# Patient Record
Sex: MALE | Race: WHITE | NOT HISPANIC OR LATINO | Employment: FULL TIME | URBAN - METROPOLITAN AREA
[De-identification: names, ages, dates, MRNs, and addresses within clinical notes are randomized per-mention and may not be internally consistent; named-entity substitution may affect disease eponyms.]

---

## 2017-07-21 ENCOUNTER — OFFICE VISIT (OUTPATIENT)
Dept: OCCUPATIONAL MEDICINE | Facility: CLINIC | Age: 57
End: 2017-07-21
Payer: COMMERCIAL

## 2017-07-21 VITALS
OXYGEN SATURATION: 99 % | BODY MASS INDEX: 25.9 KG/M2 | TEMPERATURE: 99 F | WEIGHT: 165 LBS | DIASTOLIC BLOOD PRESSURE: 80 MMHG | SYSTOLIC BLOOD PRESSURE: 130 MMHG | HEART RATE: 69 BPM | HEIGHT: 67 IN

## 2017-07-21 DIAGNOSIS — Z77.21 CONTACT WITH AND (SUSPECTED) EXPOSURE TO POTENTIALLY HAZARDOUS BODY FLUIDS: ICD-10-CM

## 2017-07-21 PROCEDURE — 99212 OFFICE O/P EST SF 10 MIN: CPT | Mod: S$GLB,,, | Performed by: NURSE PRACTITIONER

## 2017-07-21 PROCEDURE — 36415 COLL VENOUS BLD VENIPUNCTURE: CPT | Mod: S$GLB,,, | Performed by: NURSE PRACTITIONER

## 2017-07-21 PROCEDURE — 99000 SPECIMEN HANDLING OFFICE-LAB: CPT | Mod: S$GLB,,, | Performed by: NURSE PRACTITIONER

## 2017-07-21 RX ORDER — ZOLPIDEM TARTRATE 6.25 MG/1
6.25 TABLET, FILM COATED, EXTENDED RELEASE ORAL NIGHTLY PRN
COMMUNITY

## 2017-07-21 RX ORDER — TRAMADOL HYDROCHLORIDE 50 MG/1
50 TABLET ORAL EVERY 6 HOURS PRN
COMMUNITY

## 2017-07-21 NOTE — LETTER
[facility]  [address] [place] [address]  Phone: [phone]  Fax: [phone]    Pt Name: Catrachito Malloy  Injury Date: 06/06/2017   Employee ID:  Date of First Treatment: 06/08/2017   Company: [employer]            Appointment Time: 12:45 PM Arrived:  1:00 PM CDT   Physician: [facility] Departed: 2:21 PM           Office Treatment: Catrachito was seen today for body fluid exposure.    Diagnoses and all orders for this visit:    Contact with and (suspected) exposure to potentially hazardous body fluids            Restrictions: Regular Duty       Return Appointment:   9/1/2017  at  12:00 PM

## 2017-07-21 NOTE — ADDENDUM NOTE
Encounter addended by: George Crews MA on: 7/21/2017  2:23 PM<BR>    Actions taken: Letter status changed

## 2017-09-01 ENCOUNTER — OFFICE VISIT (OUTPATIENT)
Dept: OCCUPATIONAL MEDICINE | Facility: CLINIC | Age: 57
End: 2017-09-01
Payer: COMMERCIAL

## 2017-09-01 VITALS
OXYGEN SATURATION: 98 % | TEMPERATURE: 99 F | SYSTOLIC BLOOD PRESSURE: 102 MMHG | HEART RATE: 83 BPM | DIASTOLIC BLOOD PRESSURE: 64 MMHG

## 2017-09-01 DIAGNOSIS — Z77.21: ICD-10-CM

## 2017-09-01 PROCEDURE — 36415 COLL VENOUS BLD VENIPUNCTURE: CPT | Mod: S$GLB,,, | Performed by: NURSE PRACTITIONER

## 2017-09-01 PROCEDURE — 99000 SPECIMEN HANDLING OFFICE-LAB: CPT | Mod: S$GLB,,, | Performed by: NURSE PRACTITIONER

## 2017-09-01 PROCEDURE — 3008F BODY MASS INDEX DOCD: CPT | Mod: S$GLB,,, | Performed by: NURSE PRACTITIONER

## 2017-09-01 PROCEDURE — 99212 OFFICE O/P EST SF 10 MIN: CPT | Mod: S$GLB,,, | Performed by: NURSE PRACTITIONER

## 2017-09-01 NOTE — PROGRESS NOTES
Subjective:       Patient ID: Catrachito aMlloy is a 56 y.o. male.    Chief Complaint: Arm Injury ([employer] employee presents for 3rd BBP labs)    [employer]  presents for 3rd bbp labs draw. Patient denies experiencing any pain or discomfort in LEFT forearm stemming from original incident. (yal)      Arm Injury    The incident occurred more than 1 week ago. The incident occurred at work. The pain is present in the left forearm. The pain does not radiate. The pain is at a severity of 0/10. The patient is experiencing no pain. Pertinent negatives include no chest pain, numbness or tingling. Nothing aggravates the symptoms. He has tried nothing for the symptoms.     Review of Systems   Constitution: Negative for chills and fever.   Eyes: Negative for discharge.   Cardiovascular: Negative for chest pain.   Endocrine: Negative for polyuria.   Skin: Negative for rash.   Musculoskeletal: Positive for joint pain and myalgias.   Gastrointestinal: Negative for nausea and vomiting.   Genitourinary: Negative for dysuria.   Neurological: Negative for numbness and tingling.   Allergic/Immunologic: Negative for hives.       Objective:      Physical Exam   Constitutional: He appears well-developed and well-nourished.   HENT:   Nose: Nose normal.   Eyes: Conjunctivae are normal.   Pulmonary/Chest: Effort normal.   Skin: Skin is warm and dry.   Psychiatric: He has a normal mood and affect. His behavior is normal.       Assessment:       1. Personal history of contact with and (suspected) exposure to potentially hazardous body fluids        Plan:                Restrictions: Regular Duty    RTC 12/1/17

## 2017-09-01 NOTE — LETTER
[facility]  [place] [address] [place] [address]  Phone: [phone]  Fax: [phone]    Pt Name: Catrachito Malloy  Injury Date: 06/06/2017   Employee ID:  Date of Treatment: 09/01/2017   Company: [employer]            Appointment Time: 11:45 AM Arrived: 11:25 AM CDT   Physician: Ernestine Newton NP Departed: 12:25 PM           Office Treatment: Catrachito was seen today for arm injury.    Diagnoses and all orders for this visit:    Personal history of contact with and (suspected) exposure to potentially hazardous body fluids         REGULAR DUTY           Return Appointment:   12/1/2017  For FINAL BBP labs

## 2017-12-01 ENCOUNTER — OFFICE VISIT (OUTPATIENT)
Dept: OCCUPATIONAL MEDICINE | Facility: CLINIC | Age: 57
End: 2017-12-01
Payer: COMMERCIAL

## 2017-12-01 DIAGNOSIS — Z77.21 CONTACT WITH AND (SUSPECTED) EXPOSURE TO POTENTIALLY HAZARDOUS BODY FLUIDS (CODE): Primary | ICD-10-CM

## 2017-12-01 PROCEDURE — 99000 SPECIMEN HANDLING OFFICE-LAB: CPT | Mod: S$GLB,,, | Performed by: PREVENTIVE MEDICINE

## 2017-12-01 PROCEDURE — 36415 COLL VENOUS BLD VENIPUNCTURE: CPT | Mod: S$GLB,,, | Performed by: PREVENTIVE MEDICINE

## 2017-12-01 PROCEDURE — 86703 HIV-1/HIV-2 1 RESULT ANTBDY: CPT | Mod: ,,, | Performed by: PREVENTIVE MEDICINE

## 2017-12-01 PROCEDURE — 99213 OFFICE O/P EST LOW 20 MIN: CPT | Mod: S$GLB,,, | Performed by: PHYSICIAN ASSISTANT

## 2017-12-01 NOTE — LETTER
[facility] [address] [place] [address]  Phone: [phone]  Fax: [phone]    Pt Name: Catrachito Malloy  Injury Date: 06/06/2017   Employee ID:  Date of Treatment: 12/01/2017   Company: [employer]            Appointment Time: 10:45 AM Arrived: 11:00 AM CST   Physician: Maicol Garay PA-C Departed: 1125 AM           Office Treatment: EXAM, FINAL BBP LABS DRAWN,     DISCHARGED TO FULL DUTY                Return Appointment: NONE

## 2018-11-02 ENCOUNTER — OFFICE VISIT (OUTPATIENT)
Dept: URGENT CARE | Facility: CLINIC | Age: 58
End: 2018-11-02
Payer: COMMERCIAL

## 2018-11-02 VITALS
HEIGHT: 67 IN | HEART RATE: 80 BPM | BODY MASS INDEX: 25.9 KG/M2 | DIASTOLIC BLOOD PRESSURE: 94 MMHG | SYSTOLIC BLOOD PRESSURE: 168 MMHG | WEIGHT: 165 LBS | TEMPERATURE: 98 F

## 2018-11-02 DIAGNOSIS — S60.222A CONTUSION OF LEFT HAND, INITIAL ENCOUNTER: Primary | ICD-10-CM

## 2018-11-02 DIAGNOSIS — S69.92XA HAND INJURY, LEFT, INITIAL ENCOUNTER: ICD-10-CM
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
## 2018-11-02 DIAGNOSIS — M25.532 LEFT WRIST PAIN: ICD-10-CM

## 2018-11-02 LAB — POC BREATH ALCOHOL: NORMAL

## 2018-11-02 PROCEDURE — 82075 ASSAY OF BREATH ETHANOL: CPT | Mod: S$GLB,,, | Performed by: PHYSICIAN ASSISTANT

## 2018-11-02 PROCEDURE — 73110 X-RAY EXAM OF WRIST: CPT | Mod: FY,LT,S$GLB, | Performed by: RADIOLOGY

## 2018-11-02 PROCEDURE — 80305 DRUG TEST PRSMV DIR OPT OBS: CPT | Mod: S$GLB,,, | Performed by: PHYSICIAN ASSISTANT

## 2018-11-02 PROCEDURE — 99214 OFFICE O/P EST MOD 30 MIN: CPT | Mod: 25,S$GLB,, | Performed by: PHYSICIAN ASSISTANT

## 2018-11-02 PROCEDURE — 73130 X-RAY EXAM OF HAND: CPT | Mod: FY,LT,S$GLB, | Performed by: RADIOLOGY

## 2018-11-02 RX ORDER — NAPROXEN 500 MG/1
500 TABLET ORAL 2 TIMES DAILY WITH MEALS
Qty: 30 TABLET | Refills: 0 | Status: SHIPPED | OUTPATIENT
Start: 2018-11-02 | End: 2018-11-09 | Stop reason: ALTCHOICE

## 2018-11-02 NOTE — PROGRESS NOTES
Subjective:       Patient ID: Catrachito Malloy is a 58 y.o. male.    Chief Complaint: Hand Pain (Left Hand (10/31/18))    Patient is a  for [employer]. Patient states on 10/31/18 @ 0930 he smashed his hand between hand cuffs while trying to hold on to a thief that was trying to get loose from his  causing pain and swelling to the top of left wrist, hand and index finger. Patient states the  Left Index Finger hurts mostly. Pain 6/10. Taking Tramadol for pain (from pain management doctor due to back surgery fusion 2001 and back laminectomy in 2003. Tramadol minimizes pain.  Patient is wearing finger splint from old injury to finger tips on right hand 8 months ago.  Working regular duty. Ambulatory. MJB  Pt states he is ambidextrous.       Hand Pain    The incident occurred 3 to 5 days ago. The incident occurred at work. The pain is present in the left fingers, left wrist and left hand (Left hand has burning sensation and left index finger has an aching pain). The quality of the pain is described as aching and burning. The pain does not radiate. The pain is at a severity of 6/10. The pain is moderate. The pain has been constant since the incident. Pertinent negatives include no chest pain or numbness. The symptoms are aggravated by movement and lifting. He has tried immobilization (Tramadol) for the symptoms. The treatment provided mild relief.     Review of Systems   Constitution: Negative for chills and fever.   HENT: Negative for hearing loss, nosebleeds and sore throat.    Eyes: Negative for blurred vision and redness.   Cardiovascular: Negative for chest pain and syncope.   Respiratory: Negative for cough and shortness of breath.    Hematologic/Lymphatic: Negative for bleeding problem.   Skin: Negative for color change and rash.   Musculoskeletal: Positive for joint pain, joint swelling and stiffness. Negative for back pain and neck pain.   Gastrointestinal: Negative for abdominal  pain, diarrhea, nausea and vomiting.   Neurological: Negative for headaches, numbness and paresthesias.   Psychiatric/Behavioral: The patient is not nervous/anxious.        Objective:      Physical Exam   Constitutional: He appears well-developed and well-nourished. He is active. No distress.   HENT:   Head: Normocephalic and atraumatic.   Right Ear: Hearing and external ear normal.   Left Ear: Hearing and external ear normal.   Nose: Nose normal. No nasal deformity. No epistaxis.   Mouth/Throat: Oropharynx is clear and moist and mucous membranes are normal.   Eyes: Conjunctivae and lids are normal. No scleral icterus.   Neck: Trachea normal and normal range of motion.   Cardiovascular: Intact distal pulses and normal pulses.   Pulmonary/Chest: Effort normal. No stridor. No respiratory distress.   Musculoskeletal:        Left wrist: He exhibits tenderness. He exhibits normal range of motion and no swelling.        Left hand: He exhibits decreased range of motion (index finger), tenderness and swelling. He exhibits normal capillary refill and no deformity. Normal sensation noted. Decreased strength noted.        Hands:  Neurological: He is alert. He has normal strength. He is not disoriented. No sensory deficit. GCS eye subscore is 4. GCS verbal subscore is 5. GCS motor subscore is 6.   Skin: Skin is warm, dry and intact. Capillary refill takes less than 2 seconds. He is not diaphoretic.   Psychiatric: He has a normal mood and affect. His speech is normal and behavior is normal. He is attentive.   Nursing note and vitals reviewed.        X-ray Wrist Complete Left    Result Date: 11/2/2018  EXAMINATION: XR WRIST COMPLETE 3 VIEWS LEFT CLINICAL HISTORY: Unspecified injury of left wrist, hand and finger(s), initial encounter TECHNIQUE: PA, lateral, and oblique views of the left wrist were performed. COMPARISON: None FINDINGS: Three views. No dislocation.  On the oblique lateral view, there is questionable lucency  involving the trapezium, correlation with any focal tenderness in the region is recommended.  This is not demonstrated on the lateral view of the hand performed just prior to this exam..  There is mild widening of the scapholunate interval, may reflect sequela of degenerative change.  There is edema about the dorsal aspect of the hand and wrist.     1. Possible lucency involving the trapezium on the oblique lateral view versus artifact, correlation with any focal tenderness in the region is recommended. 2. Mild widening of the scapholunate interval, could reflect sequela of degenerative change. Electronically signed by: Darci Toussaint MD Date:    11/02/2018 Time:    14:55    X-ray Hand 3 View Left    Result Date: 11/2/2018  EXAMINATION: XR HAND COMPLETE 3 VIEW LEFT CLINICAL HISTORY: . Unspecified injury of left wrist, hand and finger(s), initial encounter TECHNIQUE: PA, lateral, and oblique views of the left hand were performed. COMPARISON: None FINDINGS: Three views. No acute displaced fracture or dislocation of the hand.  There may be edema about the dorsal aspect.  No radiopaque foreign body.     1. No acute displaced fracture or dislocation of the hand noting possible edema about the dorsal aspect. Electronically signed by: Darci Toussaint MD Date:    11/02/2018 Time:    14:50    Assessment:       1. Contusion of left hand, initial encounter    2. Left wrist pain    3. Hand injury, left, initial encounter    4. Work related injury        Plan:         Medications Ordered This Encounter   Medications    naproxen (NAPROSYN) 500 MG tablet     Sig: Take 1 tablet (500 mg total) by mouth 2 (two) times daily with meals.     Dispense:  30 tablet     Refill:  0         Restrictions: (Office duty only. No field work. )  Follow-up in about 1 week (around 11/9/2018).        Patient Instructions     Hand Contusion  You have a contusion. This is also called a bruise. There is swelling and some bleeding under the skin, but  no broken bones. This injury generally takes a few days to a few weeks to heal.  During that time, the bruise will typically change in color from reddish, to purple-blue, to greenish-yellow, then to yellow-brown.  Home care  · Elevate the hand to reduce pain and swelling. As much as possible, sit or lie down with the hand raised about the level of your heart. This is especially important during the first 48 hours.  · Ice the hand to help reduce pain and swelling. Wrap a cold source (ice pack or ice cubes in a plastic bag) in a thin towel. Apply to the bruised area for 20 minutes every 1 to 2 hours the first day. Continue this 3 to 4 times a day until the pain and swelling goes away.  · Unless another medicine was prescribed, you can take acetaminophen, ibuprofen, or naproxen to control pain. (If you have chronic liver or kidney disease or ever had a stomach ulcer or gastrointestinal bleeding, talk with your doctor before using these medicines.)  Follow up  Follow up with your healthcare provider or our staff as advised. Call if you are not improving within 1 to 2 weeks.  When to seek medical advice   Call your healthcare provider right away if you have any of the following:  · Increased pain or swelling  · Arm becomes cold, blue, numb or tingly  · Signs of infection: Warmth, drainage, or increased redness or pain around the bruise  · Inability to move the injured hand   · Frequent bruising for unknown reasons  Date Last Reviewed: 2/1/2017  © 3282-3389 [employer]. [address] [place] [address], [place] [address]. All rights reserved. This information is not intended as a substitute for professional medical care. Always follow your healthcare professional's instructions.

## 2018-11-02 NOTE — PATIENT INSTRUCTIONS
Hand Contusion  You have a contusion. This is also called a bruise. There is swelling and some bleeding under the skin, but no broken bones. This injury generally takes a few days to a few weeks to heal.  During that time, the bruise will typically change in color from reddish, to purple-blue, to greenish-yellow, then to yellow-brown.  Home care  · Elevate the hand to reduce pain and swelling. As much as possible, sit or lie down with the hand raised about the level of your heart. This is especially important during the first 48 hours.  · Ice the hand to help reduce pain and swelling. Wrap a cold source (ice pack or ice cubes in a plastic bag) in a thin towel. Apply to the bruised area for 20 minutes every 1 to 2 hours the first day. Continue this 3 to 4 times a day until the pain and swelling goes away.  · Unless another medicine was prescribed, you can take acetaminophen, ibuprofen, or naproxen to control pain. (If you have chronic liver or kidney disease or ever had a stomach ulcer or gastrointestinal bleeding, talk with your doctor before using these medicines.)  Follow up  Follow up with your healthcare provider or our staff as advised. Call if you are not improving within 1 to 2 weeks.  When to seek medical advice   Call your healthcare provider right away if you have any of the following:  · Increased pain or swelling  · Arm becomes cold, blue, numb or tingly  · Signs of infection: Warmth, drainage, or increased redness or pain around the bruise  · Inability to move the injured hand   · Frequent bruising for unknown reasons  Date Last Reviewed: 2/1/2017 © 2000-2017 [employer]. [address] [place] [address], [place] [address]. All rights reserved. This information is not intended as a substitute for professional medical care. Always follow your healthcare professional's instructions.

## 2018-11-02 NOTE — LETTER
[facility]  [address] [place] [address]  Phone: [phone]  Fax: [phone]  [facility]: [id number]    Pt Name: Catrachito Malloy  Injury Date: 10/31/2018   Employee ID:  Date of First Treatment: 11/02/2018   Company: [employer]      Appointment Time: 01:35 PM Arrived: 1320 PM   Provider: Maicol Garay PA-C Time Out: 1510 PM     Office Treatment:   EXAM  X-RAYS  RX GIVEN  DRUG AND ALCOHOL SCREEN  OFFICE DUTY ONLY. NO FIELD WORK  LIGHT DUTY   1. Contusion of left hand, initial encounter    2. Left wrist pain    3. Hand injury, left, initial encounter    4. Work related injury      Medications Ordered This Encounter   Medications    naproxen (NAPROSYN) 500 MG tablet           Restrictions: (Office duty only. No field work. )     Return Appointment: 11/09/2018 @ 9:30 AM

## 2018-11-02 NOTE — LETTER
[facility]  [address] [place] [address]  Phone: [phone]  Fax: [phone]  [facility]: [id number]    Pt Name: Catrachito Malloy  Injury Date: 10/31/2018   Employee ID:  Date of First Treatment: 11/02/2018   Company: [employer]      Appointment Time: 01:35 PM Arrived: 1320 PM   Provider: Maicol Garay PA-C Time Out: 1510 PM     Office Treatment:   EXAM  X-RAYS  RX GIVEN  OFFICE DUTY ONLY. NO FIELD WORK  LIGHT DUTY     1. Contusion of left hand, initial encounter    2. Left wrist pain    3. Hand injury, left, initial encounter    4. Work related injury      Medications Ordered This Encounter   Medications    naproxen (NAPROSYN) 500 MG tablet           Restrictions: (Office duty only. No field work. )     Return Appointment: 11/09/2018 @ 9:30 AM

## 2018-11-09 ENCOUNTER — OFFICE VISIT (OUTPATIENT)
Dept: URGENT CARE | Facility: CLINIC | Age: 58
End: 2018-11-09
Payer: OTHER MISCELLANEOUS
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
## 2018-11-09 DIAGNOSIS — S69.92XS HAND INJURY, LEFT, SEQUELA: ICD-10-CM

## 2018-11-09 DIAGNOSIS — S69.92XD LEFT WRIST INJURY, SUBSEQUENT ENCOUNTER: ICD-10-CM

## 2018-11-09 DIAGNOSIS — M25.532 LEFT WRIST PAIN: Primary | ICD-10-CM

## 2018-11-09 PROCEDURE — 99214 OFFICE O/P EST MOD 30 MIN: CPT | Mod: S$GLB,,, | Performed by: PHYSICIAN ASSISTANT

## 2018-11-09 PROCEDURE — 73110 X-RAY EXAM OF WRIST: CPT | Mod: FY,LT,S$GLB, | Performed by: RADIOLOGY

## 2018-11-09 RX ORDER — ACETAMINOPHEN 500 MG
1000 TABLET ORAL EVERY 6 HOURS PRN
Qty: 50 TABLET | Refills: 0 | Status: SHIPPED | OUTPATIENT
Start: 2018-11-09 | End: 2019-11-09

## 2018-11-09 NOTE — LETTER
[facility]  [address] [place] [address]  Phone: [phone]  Fax: [phone]  [facility]: [id number]    Pt Name: Catrachito Malloy  Injury Date: 10/31/2018   Employee ID:  Date of Treatment: 11/09/2018   Company: [employer]      Appointment Time: 09:15 AM Arrived: 0930 AM   Provider: Maicol Garay PA-C Time Out:1100 AM     Office Treatment:   EXAM  RX GIVEN  SPLINT APPLIED  MRI ONCE APPROVED  LIGHT DUTY    1. Left wrist pain    2. Left wrist injury, subsequent encounter    3. Hand injury, left, sequela    4. Work related injury      Medications Ordered This Encounter   Medications    acetaminophen (TYLENOL EXTRA STRENGTH) 500 MG tablet      Patient Instructions: Use splint as directed, Attention not to aggravate affected area, MRI to be scheduled once authorized      Restrictions: (Office duty only. No field work.)     Return Appointment: 11/16/2018 at 0930 AM

## 2018-11-09 NOTE — PROGRESS NOTES
Subjective:       Patient ID: Catrachito Malloy is a 58 y.o. male.    Chief Complaint: Hand Injury (left hand 10/31/18)    Patient is a follow up for left hand from 10/31/18. Patient states pain is worse 7/10, working light dluty, Naproxen not helping and the pain is in wrist and hand now. Ambulatory. MJB      Hand Injury    His dominant hand is their right hand. The incident occurred more than 1 week ago. The incident occurred at work. The pain is present in the left wrist and left hand. The quality of the pain is described as aching. The pain is at a severity of 7/10. The pain is mild. The pain has been worsening since the incident. Pertinent negatives include no chest pain or numbness. He has tried NSAIDs for the symptoms. The treatment provided no relief.     Review of Systems   Constitution: Negative for chills and fever.   HENT: Negative for hearing loss, nosebleeds and sore throat.    Eyes: Negative for blurred vision and redness.   Cardiovascular: Negative for chest pain and syncope.   Respiratory: Negative for cough and shortness of breath.    Hematologic/Lymphatic: Negative for bleeding problem.   Skin: Negative for color change and rash.   Musculoskeletal: Positive for joint pain, joint swelling and stiffness. Negative for back pain and neck pain.   Gastrointestinal: Negative for abdominal pain, diarrhea, nausea and vomiting.   Neurological: Negative for headaches, numbness and paresthesias.   Psychiatric/Behavioral: The patient is not nervous/anxious.        Objective:      Physical Exam   Constitutional: He appears well-developed and well-nourished. He is active. No distress.   HENT:   Head: Normocephalic and atraumatic.   Right Ear: Hearing and external ear normal.   Left Ear: Hearing and external ear normal.   Nose: Nose normal. No nasal deformity. No epistaxis.   Mouth/Throat: Oropharynx is clear and moist and mucous membranes are normal.   Eyes: Conjunctivae and lids are normal. No scleral icterus.    Neck: Trachea normal and normal range of motion.   Cardiovascular: Intact distal pulses and normal pulses.   Pulmonary/Chest: Effort normal. No stridor. No respiratory distress.   Musculoskeletal:        Left wrist: He exhibits decreased range of motion, tenderness (dorsal-radial aspect) and swelling.        Left hand: He exhibits decreased range of motion (index finger flexion at MCP), tenderness (2nd MCPJ) and swelling. He exhibits normal capillary refill and no deformity. Normal sensation noted.   Neurological: He is alert. He has normal strength. He is not disoriented. No sensory deficit. GCS eye subscore is 4. GCS verbal subscore is 5. GCS motor subscore is 6.   Skin: Skin is warm, dry and intact. Capillary refill takes less than 2 seconds. He is not diaphoretic.   Psychiatric: He has a normal mood and affect. His speech is normal and behavior is normal. He is attentive.   Nursing note and vitals reviewed.        X-ray Wrist Complete Left    Result Date: 11/9/2018  EXAMINATION: XR WRIST COMPLETE 3 VIEWS LEFT CLINICAL HISTORY: abnormal x-ray last week. still focal ttp; Pain in left wrist TECHNIQUE: PA, lateral, and oblique views of the left wrist were performed. COMPARISON: November 2, 2018. FINDINGS: There is widening at the scapholunate junction likely ligamentous disruption.  Some soft tissue swelling about the dorsum of the wrist.  The     There is widening between the navicular and lunate may represent ligamentous injury.  Persistent soft tissue swelling about the dorsum of the wrist.  Further evaluation suggested. This report was flagged in Epic as abnormal. Electronically signed by: Theo Tenorio MD Date:    11/09/2018 Time:    10:47    X-ray Wrist Complete Left    Result Date: 11/2/2018  EXAMINATION: XR WRIST COMPLETE 3 VIEWS LEFT CLINICAL HISTORY: Unspecified injury of left wrist, hand and finger(s), initial encounter TECHNIQUE: PA, lateral, and oblique views of the left wrist were performed.  COMPARISON: None FINDINGS: Three views. No dislocation.  On the oblique lateral view, there is questionable lucency involving the trapezium, correlation with any focal tenderness in the region is recommended.  This is not demonstrated on the lateral view of the hand performed just prior to this exam..  There is mild widening of the scapholunate interval, may reflect sequela of degenerative change.  There is edema about the dorsal aspect of the hand and wrist.     1. Possible lucency involving the trapezium on the oblique lateral view versus artifact, correlation with any focal tenderness in the region is recommended. 2. Mild widening of the scapholunate interval, could reflect sequela of degenerative change. Electronically signed by: Darci Toussaint MD Date:    11/02/2018 Time:    14:55    X-ray Hand 3 View Left    Result Date: 11/2/2018  EXAMINATION: XR HAND COMPLETE 3 VIEW LEFT CLINICAL HISTORY: . Unspecified injury of left wrist, hand and finger(s), initial encounter TECHNIQUE: PA, lateral, and oblique views of the left hand were performed. COMPARISON: None FINDINGS: Three views. No acute displaced fracture or dislocation of the hand.  There may be edema about the dorsal aspect.  No radiopaque foreign body.     1. No acute displaced fracture or dislocation of the hand noting possible edema about the dorsal aspect. Electronically signed by: Darci Toussaint MD Date:    11/02/2018 Time:    14:50    Assessment:       1. Left wrist pain    2. Left wrist injury, subsequent encounter    3. Hand injury, left, sequela    4. Work related injury        Plan:       Suspected trapezium fracture and/or scapholunate ligament injury with focal TTP and x-ray findings. . Decreased AROM of left index finger.     Medications Ordered This Encounter   Medications    acetaminophen (TYLENOL EXTRA STRENGTH) 500 MG tablet     Sig: Take 2 tablets (1,000 mg total) by mouth every 6 (six) hours as needed for Pain.     Dispense:  50 tablet      Refill:  0     Discontinue Naproxen due to possible fx. Ice 2-3 times daily for 20 minutes. Thumb spica splint 24/7     Patient Instructions: Use splint as directed, Attention not to aggravate affected area, MRI to be scheduled once authorized   Restrictions: (Office duty only. No field work.)  Follow-up in about 1 week (around 11/16/2018).

## 2018-12-14 ENCOUNTER — HOSPITAL ENCOUNTER (OUTPATIENT)
Dept: RADIOLOGY | Facility: HOSPITAL | Age: 58
Discharge: HOME | End: 2018-12-14
Attending: PHYSICIAN ASSISTANT
Payer: OTHER MISCELLANEOUS

## 2018-12-14 DIAGNOSIS — S69.92XD LEFT WRIST INJURY, SUBSEQUENT ENCOUNTER: ICD-10-CM

## 2018-12-14 PROCEDURE — 73221 MRI JOINT UPR EXTREM W/O DYE: CPT | Mod: TC,LT

## 2018-12-14 PROCEDURE — 73221 MRI JOINT UPR EXTREM W/O DYE: CPT | Mod: 26,LT,, | Performed by: RADIOLOGY

## 2019-01-02 ENCOUNTER — OFFICE VISIT (OUTPATIENT)
Dept: URGENT CARE | Facility: CLINIC | Age: 59
End: 2019-01-02
Payer: OTHER MISCELLANEOUS

## 2019-01-02 ENCOUNTER — HOSPITAL ENCOUNTER (OUTPATIENT)
Dept: RADIOLOGY | Facility: HOSPITAL | Age: 59
Discharge: HOME | End: 2019-01-02
Attending: PHYSICIAN ASSISTANT
Payer: OTHER MISCELLANEOUS

## 2019-01-02 DIAGNOSIS — M25.532 LEFT WRIST PAIN: Primary | ICD-10-CM

## 2019-01-02 DIAGNOSIS — S69.92XS HAND INJURY, LEFT, SEQUELA: ICD-10-CM
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
## 2019-01-02 DIAGNOSIS — S69.92XD LEFT WRIST INJURY, SUBSEQUENT ENCOUNTER: ICD-10-CM

## 2019-01-02 PROCEDURE — 99213 PR OFFICE/OUTPT VISIT, EST, LEVL III, 20-29 MIN: ICD-10-PCS | Mod: S$GLB,,, | Performed by: NURSE PRACTITIONER

## 2019-01-02 PROCEDURE — 99213 OFFICE O/P EST LOW 20 MIN: CPT | Mod: S$GLB,,, | Performed by: NURSE PRACTITIONER

## 2019-01-02 PROCEDURE — 73218 MRI HAND FINGERS WITHOUT CONTRAST LEFT: ICD-10-PCS | Mod: 26,LT,, | Performed by: RADIOLOGY

## 2019-01-02 PROCEDURE — 73218 MRI UPPER EXTREMITY W/O DYE: CPT | Mod: TC,LT

## 2019-01-02 PROCEDURE — 73218 MRI UPPER EXTREMITY W/O DYE: CPT | Mod: 26,LT,, | Performed by: RADIOLOGY

## 2019-01-02 NOTE — LETTER
[facility]  [address] [place] [address]  Phone: [phone]  Fax: [phone]  [facility]: [id number]     Name: Catrachito Malloy  Injury Date: 10/31/2018   Employee ID:  Date of Treatment: 01/02/2019   Company: [employer]      Appointment Time: 08:45 AM Arrived: 8:57AM   Provider: Ernestine Newton NP Time Out: 10:00AM     Office Treatment:   EXAM:  MRI SCHEDULED TODAY (1/2/19) AT 3:15PM-[facility]  LIGHT DUTY    1. Left wrist pain    2. Left wrist injury, subsequent encounter    3. Hand injury, left, sequela    4. Work related injury          Patient Instructions: Use splint as directed, Attention not to aggravate affected area(MRI of left hand today 3:15pm.)      Restrictions: (Office duty only. No field work.)     Return Appointment:  1/8/2019 at 4:00PM

## 2019-01-02 NOTE — PROGRESS NOTES
Subjective:       Patient ID: Catrachito Malloy is a 58 y.o. male.    Chief Complaint: Hand Injury ( (left hand 10/31/18))    Follow up left hand Injury 10/31/18. Patient states pain is worse 8/10. Working light duty, Tramadol & Tylenol for the pain doesn't help. MRI 12/14/18. Ambulatory. MJB Says he continues to have pain in Left Hand. Unable to flex left index finger. MRI of wrist was done. He says he was supposed to have a MRI of hand with contrast but says it was not approved. MWT      Hand Injury    His dominant hand is their right hand. The incident occurred more than 1 week ago. The incident occurred at work. The quality of the pain is described as aching. The pain does not radiate. The pain is at a severity of 8/10. The pain is moderate. Pertinent negatives include no chest pain. The symptoms are aggravated by movement. He has tried acetaminophen and NSAIDs for the symptoms. The treatment provided no relief.     Review of Systems   Constitution: Negative for chills and fever.   HENT: Negative for sore throat.    Eyes: Negative for blurred vision.   Cardiovascular: Negative for chest pain.   Respiratory: Negative for shortness of breath.    Skin: Negative for rash.   Musculoskeletal: Positive for joint pain and stiffness. Negative for back pain.   Gastrointestinal: Negative for abdominal pain, diarrhea, nausea and vomiting.   Neurological: Negative for headaches.   Psychiatric/Behavioral: The patient is not nervous/anxious.        Objective:      Physical Exam   Constitutional: He is oriented to person, place, and time. He appears well-developed and well-nourished. No distress.   Pt appears aggravated.   HENT:   Right Ear: External ear normal.   Left Ear: External ear normal.   Nose: Nose normal.   Eyes: Conjunctivae are normal.   Cardiovascular: Intact distal pulses.   Pulmonary/Chest: Effort normal.   Musculoskeletal:        Left hand: He exhibits decreased range of motion, tenderness and swelling. He  exhibits normal capillary refill. Decreased strength noted.        Hands:  Pain primarily to dorsum of left hand along Index finger & MCP joint and metacarpal. Also has pain over other metacarpal bones. Unable to flex index finger fully. Can flex other fingers. No pain to thumb. Less pain to left wrist. Some pain to dorsum of left wrist with extension. Primary complaint is pain to fingers and hand.   Neurological: He is alert and oriented to person, place, and time.   Skin: Skin is warm and dry. No erythema.   Psychiatric: He has a normal mood and affect. His behavior is normal.       Assessment:       1. Left wrist pain    2. Left wrist injury, subsequent encounter    3. Hand injury, left, sequela    4. Work related injury        Plan:     Mri Wrist Joint Without Contrast Left    Result Date: 12/14/2018  EXAMINATION: MRI WRIST WITHOUT CONTRAST LEFT CLINICAL HISTORY: Suspected trapezium fracture;  Unspecified injury of left wrist, hand and finger(s), subsequent encounter TECHNIQUE: Multiplanar and multisequence MRI of the left wrist was performed without intravenous contrast. COMPARISON: X-rays of the left wrist dated 11/09/2018 and 11/02/2018. FINDINGS: No edema type bone marrow signal is identified to suggest acute injury.  There are punctate increased T2 subchondral cystic changes involving the distal aspects of the lunate bone.  Similar T2 signal subchondral cystic changes are identified involving the proximal and distal poles of the scaphoid of the left wrist.  The trapezium and the remainder of the carpal bones are within normal limits. There is persistent widening of the scapholunate interval measuring approximately 8.4 mm.  There is mild negative ulnar variance.  The remainder of the alignment of the left wrist is within normal limits. The TFCC appears unremarkable.  The remainder of the visualized ligaments in the left wrist are within normal limits.  The tendon sheaths appear unremarkable.     No evidence  of fracture or edema involving the left trapezium, as clinically questioned. Minimal subchondral changes involving the lunate and the capitate bone, suggestive of osteoarthrosis. Persistent widening of the scapholunate interval measuring 8.4 mm.  The findings are consistent with scapholunate ligament injury.  No definitive evidence of [facility] wrist on the current examination.  Continued short-term follow-up is suggested. Electronically signed by: Gian Bailey MD Date:    12/14/2018 Time:    22:37  Pt to follow up with Dr Helms.  I reviewed MRI of wrist result with Dr Helms per phone and with patient.     Patient Instructions: Use splint as directed, Attention not to aggravate affected area(MRI of left hand today 3:15pm.)   Restrictions: (Office duty only. No field work.)  Follow-up in about 6 days (around 1/8/2019).

## 2019-01-08 ENCOUNTER — OFFICE VISIT (OUTPATIENT)
Dept: URGENT CARE | Facility: CLINIC | Age: 59
End: 2019-01-08
Payer: OTHER MISCELLANEOUS

## 2019-01-08 DIAGNOSIS — S60.212S CONTUSION OF LEFT WRIST, SEQUELA: ICD-10-CM

## 2019-01-08 DIAGNOSIS — M19.032 OSTEOARTHRITIS OF LEFT WRIST, UNSPECIFIED OSTEOARTHRITIS TYPE: ICD-10-CM

## 2019-01-08 DIAGNOSIS — S60.022D CONTUSION OF LEFT INDEX FINGER WITHOUT DAMAGE TO NAIL, SUBSEQUENT ENCOUNTER: ICD-10-CM

## 2019-01-08 DIAGNOSIS — M25.532 LEFT WRIST PAIN: Primary | ICD-10-CM

## 2019-01-08 DIAGNOSIS — M19.042 ARTHRITIS OF FINGER OF LEFT HAND: ICD-10-CM

## 2019-01-08 PROCEDURE — 99214 OFFICE O/P EST MOD 30 MIN: CPT | Mod: S$GLB,,, | Performed by: PREVENTIVE MEDICINE

## 2019-01-08 PROCEDURE — 99214 PR OFFICE/OUTPT VISIT, EST, LEVL IV, 30-39 MIN: ICD-10-PCS | Mod: S$GLB,,, | Performed by: PREVENTIVE MEDICINE

## 2019-01-08 NOTE — PROGRESS NOTES
Subjective:       Patient ID: Catrachito Malloy is a 58 y.o. male.    Chief Complaint: Hand Injury (LEFT  10/31/2018)    Pt returned to the clinic today for a follow up visit for a left hand injury. Pt states his injury has not improved since his last visit. IJ      Hand Injury    His dominant hand is their right hand. The incident occurred more than 1 week ago. The incident occurred at work. The quality of the pain is described as aching and burning. The pain does not radiate. The pain is at a severity of 8/10. The pain is moderate. Pertinent negatives include no chest pain. The symptoms are aggravated by movement. He has tried acetaminophen and NSAIDs for the symptoms. The treatment provided no relief.     Review of Systems   Constitution: Negative for chills and fever.   HENT: Negative for sore throat.    Eyes: Negative for blurred vision.   Cardiovascular: Negative for chest pain.   Respiratory: Negative for shortness of breath.    Skin: Negative for rash.   Musculoskeletal: Positive for joint pain and stiffness. Negative for back pain.   Gastrointestinal: Negative for abdominal pain, diarrhea, nausea and vomiting.   Neurological: Negative for headaches.   Psychiatric/Behavioral: The patient is not nervous/anxious.        Objective:      Physical Exam   Constitutional: He is oriented to person, place, and time. He appears well-developed and well-nourished. No distress.   Pt appears aggravated.   HENT:   Right Ear: External ear normal.   Left Ear: External ear normal.   Nose: Nose normal.   Eyes: Conjunctivae are normal.   Cardiovascular: Intact distal pulses.   Pulmonary/Chest: Effort normal.   Musculoskeletal:        Left hand: He exhibits decreased range of motion, tenderness and swelling. He exhibits normal capillary refill. Decreased strength noted.        Hands:  Pain primarily to dorsum of left hand along Index finger & MCP joint and second metacarpal. Unable to flex index finger fully. Can flex other  fingers. No pain to thumb. Less pain to left wrist. Some pain to dorsum of left wrist with extension. Primary complaint is pain to index finger.   Neurological: He is alert and oriented to person, place, and time.   Skin: Skin is warm and dry. No erythema.   Psychiatric: He has a normal mood and affect. His behavior is normal.       Assessment:       1. Left wrist pain    2. Contusion of left wrist, sequela    3. Contusion of left index finger without damage to nail, subsequent encounter    4. Osteoarthritis of left wrist, unspecified osteoarthritis type    5. Arthritis of finger of left hand        Plan:            Patient Instructions: Daily home exercises/warm soaks, Use splint as directed(Discussed at length the results of MRI of left wrist and left index finger. Patient will continue pain medication prescribed for his back pain by Dr Barrett.)   Restrictions: Sedentary work only, Limited use of left hand and arm, Discharged to Ortho/Neuro/Opthomologist/Surgeon, Discharged from Occupational Health(No field work. Patient will follow up with Dr Quach, his preference)  Follow-up if symptoms worsen or fail to improve.

## 2019-01-08 NOTE — LETTER
[facility]  [place] [id number]  Phone: [phone]  Fax: [phone]  [facility]: [phone]    Pt Name: Catrachito Malloy  Injury Date: 10/31/2018   Employee ID:  Date of Treatment: 01/08/2019   Company:  [employer]      Appointment Time: 0830 AM Arrived: 1550 PM   Provider: Isidro Helms MD Time Out: 1720 PM      Office Treatment:   EXAM  REVIEWED MRIs  REFERRAL TO HAND SPECIALIST (DR. ELIZABETH)  LIGHT DUTY  DISCHARGED TO HAND SPECIALIST      1. Left wrist pain    2. Contusion of left wrist, sequela    3. Contusion of left index finger without damage to nail, subsequent encounter    4. Osteoarthritis of left wrist, unspecified osteoarthritis type    5. Arthritis of finger of left hand          Patient Instructions: Daily home exercises/warm soaks, Use splint as directed(Patient will continue pain medication prescribed for his back pain by Dr Barrett.)      Restrictions: Sedentary work only, Limited use of left hand and arm, Discharged to Ortho/Neuro/Opthomologist/Surgeon, Discharged from Occupational Health(No field work. Patient will follow up with Dr Elizabeth, his preference)     Return Appointment: NONE

## 2022-10-20 NOTE — PROGRESS NOTES
Subjective:       Patient ID: Catrachito Malloy is a 57 y.o. male.    Chief Complaint: Exposure to STD (RV)    Pt returned to the clinic today for final blood draw. IJ          Exposure to STD   Pertinent negatives include no abdominal pain, chest pain, chills, diarrhea, fever, headaches, joint pain, nausea, rash, shortness of breath, sore throat or vomiting.     Review of Systems   Constitution: Negative for chills and fever.   HENT: Negative for sore throat.    Eyes: Negative for blurred vision.   Cardiovascular: Negative for chest pain.   Respiratory: Negative for shortness of breath.    Skin: Negative for rash.   Musculoskeletal: Negative for back pain and joint pain.   Gastrointestinal: Negative for abdominal pain, diarrhea, nausea and vomiting.   Neurological: Negative for headaches.   Psychiatric/Behavioral: The patient is not nervous/anxious.        Objective:      Physical Exam   Constitutional: He appears well-developed and well-nourished. He is active. No distress.   HENT:   Head: Normocephalic and atraumatic.   Right Ear: Hearing and external ear normal.   Left Ear: Hearing and external ear normal.   Nose: Nose normal. No nasal deformity. No epistaxis.   Mouth/Throat: Oropharynx is clear and moist and mucous membranes are normal.   Eyes: Conjunctivae and lids are normal. Right conjunctiva is not injected. Left conjunctiva is not injected.   Neck: Trachea normal and normal range of motion. No spinous process tenderness and no muscular tenderness present.   Cardiovascular: Intact distal pulses and normal pulses.    Pulmonary/Chest: Effort normal. No stridor. No respiratory distress.   Abdominal: Normal appearance.   Musculoskeletal:        Cervical back: Normal.        Thoracic back: Normal.   Neurological: He is alert. No sensory deficit. GCS eye subscore is 4. GCS verbal subscore is 5. GCS motor subscore is 6.   Skin: Skin is warm, dry and intact. No abrasion and no bruising noted.   Psychiatric: He has  a normal mood and affect. His speech is normal and behavior is normal. Thought content normal. Cognition and memory are normal. He is attentive.   Nursing note reviewed.      Assessment:       1. Contact with and (suspected) exposure to potentially hazardous body fluids (CODE)        Plan:       Labs per protocol.          Restrictions: Regular Duty, Discharged from Occupational Health       foot pain/injury